# Patient Record
Sex: MALE | URBAN - METROPOLITAN AREA
[De-identification: names, ages, dates, MRNs, and addresses within clinical notes are randomized per-mention and may not be internally consistent; named-entity substitution may affect disease eponyms.]

---

## 2019-07-16 ENCOUNTER — MEDICAL CORRESPONDENCE (OUTPATIENT)
Dept: HEALTH INFORMATION MANAGEMENT | Facility: CLINIC | Age: 24
End: 2019-07-16

## 2019-10-21 ENCOUNTER — TRANSFERRED RECORDS (OUTPATIENT)
Dept: HEALTH INFORMATION MANAGEMENT | Facility: CLINIC | Age: 24
End: 2019-10-21

## 2019-11-18 ENCOUNTER — DOCUMENTATION ONLY (OUTPATIENT)
Dept: NEUROLOGY | Facility: CLINIC | Age: 24
End: 2019-11-18

## 2019-11-18 NOTE — PROGRESS NOTES
Received referral and records from [facility] , sent to be scanned and placed in draw for apt.

## 2019-11-21 ENCOUNTER — PRE VISIT (OUTPATIENT)
Dept: NEUROLOGY | Facility: CLINIC | Age: 24
End: 2019-11-21

## 2019-11-21 NOTE — TELEPHONE ENCOUNTER
FUTURE VISIT INFORMATION      FUTURE VISIT INFORMATION:    Date: 1/11/2020    Time: 1030AM    [place] [facility] [place]  REFERRAL INFORMATION:    Referring provider:  Marjan Worthington     Referring providers clinic:  [facility]     Reason for visit/diagnosis  Dystonia     RECORDS REQUESTED FROM:       Clinic name Comments Records Status Imaging Status   [facility]  Care Everywhere and Media N/A    [facility]  Care Everywhere Requested 2017 and 2015 imaging                              -1/7/2020 2nd request for images called [facility]- 1/7/2020

## 2020-01-11 ENCOUNTER — TELEPHONE (OUTPATIENT)
Dept: NEUROLOGY | Facility: CLINIC | Age: 25
End: 2020-01-11

## 2020-01-13 NOTE — TELEPHONE ENCOUNTER
RECORDS RECEIVED FROM: Care Everywhere   Date of Appt: 2.5.2020   NOTES (FOR ALL VISITS) STATUS DETAILS   OFFICE NOTE from referring provider Internal 11.14.19 Jim Vitale   OFFICE NOTE from other specialist Internal 10.21.19 Jim Avila   DISCHARGE SUMMARY from hospital N/A    DISCHARGE REPORT from the ER N/A    OPERATIVE REPORT N/A    MEDICATION LIST Care Everywhere    IMAGING  (FOR ALL VISITS)     EMG N/A    EEG N/A    MRI (HEAD, NECK, SPINE) Care Everywhere 11.30.17 Brain, [facility]  12.11.15 Brain, [facility]  8.7.15 Head, [facility]   LUMBAR PUNCTURE N/A    MERVIN Scan N/A    CT (HEAD, NECK, SPINE) Care Everywhere 10.8.15 Head Brain, [facility]  8.28.15 Head, [facility]

## 2020-02-05 ENCOUNTER — OFFICE VISIT (OUTPATIENT)
Dept: NEUROLOGY | Facility: CLINIC | Age: 25
End: 2020-02-05
Payer: MEDICARE

## 2020-02-05 ENCOUNTER — PRE VISIT (OUTPATIENT)
Dept: NEUROLOGY | Facility: CLINIC | Age: 25
End: 2020-02-05

## 2020-02-05 VITALS — SYSTOLIC BLOOD PRESSURE: 106 MMHG | DIASTOLIC BLOOD PRESSURE: 70 MMHG | OXYGEN SATURATION: 96 %

## 2020-02-05 DIAGNOSIS — S09.90XS CLOSED HEAD INJURY, SEQUELA: Primary | ICD-10-CM

## 2020-02-05 RX ORDER — LEVETIRACETAM 500 MG/1
500 TABLET, FILM COATED, EXTENDED RELEASE ORAL 2 TIMES DAILY
COMMUNITY
Start: 2019-10-21 | End: 2020-10-20

## 2020-02-05 RX ORDER — CHOLECALCIFEROL (VITAMIN D3) 50 MCG
1 TABLET ORAL DAILY PRN
COMMUNITY

## 2020-02-05 RX ORDER — VIT C/B6/B5/MAGNESIUM/HERB 173 50-5-6-5MG
2 CAPSULE ORAL DAILY
COMMUNITY

## 2020-02-05 ASSESSMENT — PAIN SCALES - GENERAL: PAINLEVEL: NO PAIN (0)

## 2020-02-05 NOTE — PATIENT INSTRUCTIONS
1.  Randall has spasticity from injury from a left motor cortex lesion.  This causes an upper motor neuron lesion.  This causes hyperexcitability of his lower motor neurons in the spinal cord.  This causes his spasticity of his right side.    2.  I am going to refer you to Dr. Ernie Briones, our head injury expert.
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
## 2020-02-06 ENCOUNTER — TELEPHONE (OUTPATIENT)
Dept: PHYSICAL MEDICINE AND REHAB | Facility: CLINIC | Age: 25
End: 2020-02-06

## 2020-03-24 ENCOUNTER — CARE COORDINATION (OUTPATIENT)
Dept: PHYSICAL MEDICINE AND REHAB | Facility: CLINIC | Age: 25
End: 2020-03-24

## 2020-03-24 ENCOUNTER — TELEPHONE (OUTPATIENT)
Dept: PHYSICAL MEDICINE AND REHAB | Facility: CLINIC | Age: 25
End: 2020-03-24

## 2020-03-24 NOTE — TELEPHONE ENCOUNTER
Father returned call and decided to wait until things settle down. He does not feel the appointment is necessary at this point and will call us if things change and they need a phone visit or appointment

## 2022-01-12 ENCOUNTER — DOCUMENTATION ONLY (OUTPATIENT)
Dept: OTHER | Facility: CLINIC | Age: 27
End: 2022-01-12
Payer: MEDICARE